# Patient Record
Sex: MALE | Race: WHITE | NOT HISPANIC OR LATINO | URBAN - METROPOLITAN AREA
[De-identification: names, ages, dates, MRNs, and addresses within clinical notes are randomized per-mention and may not be internally consistent; named-entity substitution may affect disease eponyms.]

---

## 2021-04-16 ENCOUNTER — OFFICE (OUTPATIENT)
Dept: URBAN - METROPOLITAN AREA TELEHEALTH 3 | Facility: TELEHEALTH | Age: 66
End: 2021-04-16
Payer: COMMERCIAL

## 2021-04-16 VITALS — HEIGHT: 65 IN | WEIGHT: 135 LBS

## 2021-04-16 DIAGNOSIS — K21.00 GASTRO-ESOPHAGEAL REFLUX DISEASE WITH ESOPHAGITIS, WITHOUT B: ICD-10-CM

## 2021-04-16 DIAGNOSIS — R63.4 ABNORMAL WEIGHT LOSS: ICD-10-CM

## 2021-04-16 DIAGNOSIS — R14.0 ABDOMINAL DISTENSION (GASEOUS): ICD-10-CM

## 2021-04-16 PROCEDURE — 99204 OFFICE O/P NEW MOD 45 MIN: CPT | Mod: 95 | Performed by: INTERNAL MEDICINE

## 2021-04-16 NOTE — SERVICEHPINOTES
PATIENT VERIFIED BY DATE OF BIRTH AND NAME. Patient has been consented for this telecommunication visit. Mr. Lyle is a 66 yo WM w/ no significant PMhx, here for new GI consultation regarding GERD and bloating.  His younger brother is Jesús Lyle, a good friend and  for [employer].  He is in [employer]. He had COVID in mid December, with symptoms of fever and cough.  He has a hx of environmental allergies, deviated septum requiring surgery.  He's had stomach issues dating back to childhood, mainly GERD.  Since last August, he's been noticing more belching, along with nausea, and some mild weight loss.  His PCP placed him on probiotics.  This caused more gas, so he stopped it.  He was taking Aleve, 4 cups of black coffee and wine throughout last fall.  He took Prilosec OTC x 14 days which did help at that time.  All 10 point review of systems have been reviewed as per HPI and otherwise negative.

## 2021-05-07 ENCOUNTER — ON CAMPUS - OUTPATIENT (OUTPATIENT)
Dept: URBAN - METROPOLITAN AREA HOSPITAL 16 | Facility: HOSPITAL | Age: 66
End: 2021-05-07
Payer: COMMERCIAL

## 2021-05-07 DIAGNOSIS — R14.0 ABDOMINAL DISTENSION (GASEOUS): ICD-10-CM

## 2021-05-07 DIAGNOSIS — R63.4 ABNORMAL WEIGHT LOSS: ICD-10-CM

## 2021-05-07 DIAGNOSIS — K21.00 GASTRO-ESOPHAGEAL REFLUX DISEASE WITH ESOPHAGITIS, WITHOUT B: ICD-10-CM

## 2021-05-07 DIAGNOSIS — K29.60 OTHER GASTRITIS WITHOUT BLEEDING: ICD-10-CM

## 2021-05-07 PROCEDURE — 43239 EGD BIOPSY SINGLE/MULTIPLE: CPT | Performed by: INTERNAL MEDICINE

## 2021-08-30 ENCOUNTER — OFFICE (OUTPATIENT)
Dept: URBAN - METROPOLITAN AREA TELEHEALTH 12 | Facility: TELEHEALTH | Age: 66
End: 2021-08-30
Payer: COMMERCIAL

## 2021-08-30 VITALS — HEIGHT: 65 IN | WEIGHT: 136 LBS

## 2021-08-30 DIAGNOSIS — R63.4 ABNORMAL WEIGHT LOSS: ICD-10-CM

## 2021-08-30 DIAGNOSIS — R14.0 ABDOMINAL DISTENSION (GASEOUS): ICD-10-CM

## 2021-08-30 DIAGNOSIS — R10.33 PERIUMBILICAL PAIN: ICD-10-CM

## 2021-08-30 PROCEDURE — 99214 OFFICE O/P EST MOD 30 MIN: CPT | Mod: 95 | Performed by: INTERNAL MEDICINE

## 2021-08-30 RX ORDER — RIFAXIMIN 550 MG/1
TABLET ORAL
Qty: 42 | Refills: 1 | Status: COMPLETED
Start: 2021-08-30 | End: 2022-01-21

## 2021-08-30 NOTE — SERVICEHPINOTES
PATIENT VERIFIED BY DATE OF BIRTH AND NAME. Patient has been consented for this telecommunication visit. Mr. Lyle is a 66 yo WM w/ no significant PMhx, here for new GI consultation regarding GERD and bloating. His younger brother is Jesús Lyle, a good friend and  for [employer]. He is in [employer]. He had COVID in mid December, with symptoms of fever and cough. He has a hx of environmental allergies, deviated septum requiring surgery. He's had stomach issues dating back to childhood, mainly GERD. Since last August, he's been noticing more belching, along with nausea, and some mild weight loss. His PCP placed him on probiotics. This caused more gas, so he stopped it. He was taking Aleve, 4 cups of black coffee and wine throughout last fall. He took Prilosec OTC x 14 days which did help at that time.  He then underwent EGD last May which revealed mild reflux esophagitis so he was placed on Omeprazole.  Despite this, he is still feeling gas, distension, bloating, flatulence.  He's wondering if he could have SIBO.All 10 point review of systems have been reviewed as per HPI and otherwise negative.

## 2022-01-21 ENCOUNTER — OFFICE (OUTPATIENT)
Dept: URBAN - METROPOLITAN AREA TELEHEALTH 12 | Facility: TELEHEALTH | Age: 67
End: 2022-01-21
Payer: COMMERCIAL

## 2022-01-21 VITALS — HEIGHT: 65 IN | WEIGHT: 137 LBS

## 2022-01-21 DIAGNOSIS — R14.0 ABDOMINAL DISTENSION (GASEOUS): ICD-10-CM

## 2022-01-21 DIAGNOSIS — K58.9 IRRITABLE BOWEL SYNDROME WITHOUT DIARRHEA: ICD-10-CM

## 2022-01-21 DIAGNOSIS — Z86.16 PERSONAL HISTORY OF COVID-19: ICD-10-CM

## 2022-01-21 PROCEDURE — 99214 OFFICE O/P EST MOD 30 MIN: CPT | Mod: 95 | Performed by: INTERNAL MEDICINE

## 2022-01-21 NOTE — SERVICEHPINOTES
PATIENT VERIFIED BY DATE OF BIRTH AND NAME. Patient has been consented for this telecommunication visit.
kourtney StevensPancho Lyle is a 65 yo WM w/ no significant PMhx, here for new GI consultation regarding GERD and bloating. His younger brother is Jesús Lyle, a good friend and  for [employer]. He is in [employer].  See clinical summary below:He had COVID in mid December of 2020, with symptoms of fever and cough. He has a hx of environmental allergies, deviated septum requiring surgery. He's had stomach issues dating back to childhood, mainly GERD. Since last August, he's been noticing more belching, along with nausea, and some mild weight loss. His PCP placed him on probiotics. This caused more gas, so he stopped it. 
br
br He was given trial of Xifaxan with only minimal improvement.   EGD was unremarkable and PPI rx didin't do much.  CT imaging and blood work all unremarkable.  He does counsume dairy products. All 10 point review of systems have been reviewed as per HPI and otherwise negative.brAssessmentbr- Bloating symptom br br

## 2022-03-22 ENCOUNTER — OFFICE (OUTPATIENT)
Dept: URBAN - METROPOLITAN AREA TELEHEALTH 7 | Facility: TELEHEALTH | Age: 67
End: 2022-03-22
Payer: COMMERCIAL

## 2022-03-22 VITALS — WEIGHT: 137 LBS | HEIGHT: 65 IN

## 2022-03-22 DIAGNOSIS — R14.0 ABDOMINAL DISTENSION (GASEOUS): ICD-10-CM

## 2022-03-22 DIAGNOSIS — R10.33 PERIUMBILICAL PAIN: ICD-10-CM

## 2022-03-22 DIAGNOSIS — K58.9 IRRITABLE BOWEL SYNDROME WITHOUT DIARRHEA: ICD-10-CM

## 2022-03-22 DIAGNOSIS — K21.00 GASTRO-ESOPHAGEAL REFLUX DISEASE WITH ESOPHAGITIS, WITHOUT B: ICD-10-CM

## 2022-03-22 PROCEDURE — 99214 OFFICE O/P EST MOD 30 MIN: CPT | Mod: 95 | Performed by: INTERNAL MEDICINE

## 2022-03-22 NOTE — SERVICEHPINOTES
PATIENT VERIFIED BY DATE OF BIRTH AND NAME. Patient has been consented for this telecommunication visit. 
Mr. Lyle is here for follow up for gas, bloating, rectal discharge. His younger brother is Jesús Lyle, a good friend and  for [employer]. He is in [employer].  See clinical summary below:He had COVID in mid December of 2020, with symptoms of fever and cough. He has a hx of environmental allergies, deviated septum requiring surgery. He's had stomach issues dating back to childhood, mainly GERD. Since last August, he's been noticing more belching, along with nausea, and some mild weight loss. His PCP placed him on probiotics. This caused more gas, so he stopped it.He was given trial of Xifaxan with only minimal improvement.   EGD was unremarkable and PPI rx didn't do much.  CT imaging and blood work all unremarkable.  He has cut out dairy and gluten products.  The gas and bloating have improved.  
kourtney Velasquez 10 point review of systems have been reviewed as per HPI and otherwise negative. kourtney oconnell

## 2022-05-17 ENCOUNTER — OFFICE (OUTPATIENT)
Dept: URBAN - METROPOLITAN AREA TELEHEALTH 3 | Facility: TELEHEALTH | Age: 67
End: 2022-05-17
Payer: COMMERCIAL

## 2022-05-17 VITALS — WEIGHT: 136 LBS | HEIGHT: 65 IN

## 2022-05-17 DIAGNOSIS — R10.33 PERIUMBILICAL PAIN: ICD-10-CM

## 2022-05-17 DIAGNOSIS — R63.4 ABNORMAL WEIGHT LOSS: ICD-10-CM

## 2022-05-17 DIAGNOSIS — K58.9 IRRITABLE BOWEL SYNDROME WITHOUT DIARRHEA: ICD-10-CM

## 2022-05-17 PROCEDURE — 99214 OFFICE O/P EST MOD 30 MIN: CPT | Mod: 95 | Performed by: INTERNAL MEDICINE

## 2022-05-17 RX ORDER — MIRTAZAPINE 7.5 MG/1
TABLET ORAL
Qty: 30 | Refills: 2 | Status: COMPLETED
Start: 2022-05-17 | End: 2022-07-18

## 2022-05-17 NOTE — SERVICEHPINOTES
PATIENT VERIFIED BY DATE OF BIRTH AND NAME. Patient has been consented for this telecommunication visit. 
kourtney Story Valdo is here for follow up for gas, bloating, rectal discharge. His younger brother is Jesús Lyle, a good friend and  for [employer]. He is in [employer]. See clinical summary below:He had COVID in mid December of 2020, with symptoms of fever and cough. He has a hx of environmental allergies, deviated septum requiring surgery. He's had stomach issues dating back to childhood, mainly GERD. Since last August, he's been noticing more belching, along with nausea, and some mild weight loss. His PCP placed him on probiotics. This caused more gas, so he stopped it.He was given trial of Xifaxan with only minimal improvement.  EGD was unremarkable and PPI rx didn't do much. CT imaging and blood work all unremarkable. He has cut out dairy and gluten products. The gas and bloating have improved.  On last visit he was also asked to stop dairy products, Omeprazole, and begin trial of Metamucil daily.  He continues to have tremendous gas, bloating and decreased appetite with 10-lb weight loss.  He admits to lots of stress since he lost his company over the past 2 years.All 10 point review of systems have been reviewed as per HPI and otherwise negative.
kourtney

## 2022-11-04 ENCOUNTER — OFFICE (OUTPATIENT)
Dept: URBAN - METROPOLITAN AREA CLINIC 34 | Facility: CLINIC | Age: 67
End: 2022-11-04

## 2022-11-04 PROCEDURE — 00031: CPT | Performed by: INTERNAL MEDICINE

## 2022-11-21 ENCOUNTER — ON CAMPUS - OUTPATIENT (OUTPATIENT)
Dept: URBAN - METROPOLITAN AREA HOSPITAL 16 | Facility: HOSPITAL | Age: 67
End: 2022-11-21
Payer: MEDICARE

## 2022-11-21 DIAGNOSIS — Z12.11 ENCOUNTER FOR SCREENING FOR MALIGNANT NEOPLASM OF COLON: ICD-10-CM

## 2022-11-21 PROCEDURE — G0121 COLON CA SCRN NOT HI RSK IND: HCPCS | Performed by: INTERNAL MEDICINE
